# Patient Record
Sex: MALE | Race: WHITE | NOT HISPANIC OR LATINO | Employment: FULL TIME | URBAN - METROPOLITAN AREA
[De-identification: names, ages, dates, MRNs, and addresses within clinical notes are randomized per-mention and may not be internally consistent; named-entity substitution may affect disease eponyms.]

---

## 2023-07-05 ENCOUNTER — OFFICE VISIT (OUTPATIENT)
Dept: URGENT CARE | Facility: CLINIC | Age: 58
End: 2023-07-05
Payer: COMMERCIAL

## 2023-07-05 VITALS — OXYGEN SATURATION: 99 % | WEIGHT: 245 LBS | RESPIRATION RATE: 18 BRPM | TEMPERATURE: 98.8 F | HEART RATE: 104 BPM

## 2023-07-05 DIAGNOSIS — J02.9 SORE THROAT: ICD-10-CM

## 2023-07-05 DIAGNOSIS — A69.20 ERYTHEMA MIGRANS (LYME DISEASE): ICD-10-CM

## 2023-07-05 DIAGNOSIS — R52 BODY ACHES: Primary | ICD-10-CM

## 2023-07-05 PROCEDURE — S9083 URGENT CARE CENTER GLOBAL: HCPCS

## 2023-07-05 PROCEDURE — G0382 LEV 3 HOSP TYPE B ED VISIT: HCPCS

## 2023-07-05 RX ORDER — DOXYCYCLINE 100 MG/1
100 CAPSULE ORAL 2 TIMES DAILY
Qty: 28 CAPSULE | Refills: 0 | Status: SHIPPED | OUTPATIENT
Start: 2023-07-05 | End: 2023-07-19

## 2023-07-05 NOTE — PROGRESS NOTES
[facility]        NAME: Luke Jaimes is a 65 y.o. male  : 1965    MRN: [id number]  DATE: 2023  TIME: 9:59 AM    Assessment and Plan   Body aches [R52]  1. Body aches        2. Sore throat        3. Erythema migrans (Lyme disease)  doxycycline monohydrate (MONODOX) 100 mg capsule        Patient presents for eval of 3 weeks body aches, feeling feverish. Yesterday noticed a bullseye rash to his left upper arm. States works outside a lot and there are a lot of ticks. Assessment notes bullseye rash to left upper arm. Discussed starting abx and PCP f/u for labs. Patient Instructions       Follow up with PCP     Chief Complaint     Chief Complaint   Patient presents with   • Generalized Body Aches     Body aches, bullseye rash          History of Present Illness       Patient presents for eval of 3 weeks body aches, feeling feverish. Yesterday noticed a bullseye rash to his left upper arm. [employer] works outside a lot and there are a lot of ticks. Assessment notes bullseye rash to left upper arm. Discussed starting abx and PCP f/u for labs. Review of Systems   Review of Systems   Constitutional: Positive for activity change, fatigue and fever. HENT: Positive for sore throat. Negative for congestion and postnasal drip. Musculoskeletal: Positive for myalgias. Skin: Positive for rash. Current Medications       Current Outpatient Medications:   •  doxycycline monohydrate (MONODOX) 100 mg capsule, Take 1 capsule (100 mg total) by mouth 2 (two) times a day for 14 days, Disp: 28 capsule, Rfl: 0    Current Allergies     Allergies as of 2023   • (Not on File)            The following portions of the patient's history were reviewed and updated as appropriate: allergies, current medications, past family history, past medical history, past social history, past surgical history and problem list.     No past medical history on file. No past surgical history on file.     No family history on file. Medications have been verified. Objective   Pulse 104   Temp 98.8 °F (37.1 °C)   Resp 18   Wt 111 kg (245 lb)   SpO2 99%   No LMP for male patient. Physical Exam     Physical Exam  Vitals reviewed. Constitutional:       Appearance: Normal appearance. Cardiovascular:      Rate and Rhythm: Normal rate and regular rhythm. Pulses: Normal pulses. Heart sounds: Normal heart sounds. Pulmonary:      Effort: Pulmonary effort is normal.      Breath sounds: Normal breath sounds. Musculoskeletal:         General: No swelling or tenderness. Normal range of motion. Lymphadenopathy:      Cervical: No cervical adenopathy. Skin:     Findings: Erythema and rash present. Neurological:      General: No focal deficit present. Mental Status: He is alert and oriented to person, place, and time. Mental status is at baseline.

## 2025-03-04 ENCOUNTER — OFFICE VISIT (OUTPATIENT)
Dept: URGENT CARE | Facility: CLINIC | Age: 60
End: 2025-03-04
Payer: COMMERCIAL

## 2025-03-04 VITALS
OXYGEN SATURATION: 98 % | HEART RATE: 113 BPM | RESPIRATION RATE: 16 BRPM | HEIGHT: 71 IN | SYSTOLIC BLOOD PRESSURE: 132 MMHG | TEMPERATURE: 99.7 F | BODY MASS INDEX: 36.6 KG/M2 | WEIGHT: 261.4 LBS | DIASTOLIC BLOOD PRESSURE: 82 MMHG

## 2025-03-04 DIAGNOSIS — B34.9 VIRAL SYNDROME: Primary | ICD-10-CM

## 2025-03-04 PROCEDURE — G0382 LEV 3 HOSP TYPE B ED VISIT: HCPCS | Performed by: PHYSICIAN ASSISTANT

## 2025-03-04 RX ORDER — OSELTAMIVIR PHOSPHATE 75 MG/1
75 CAPSULE ORAL EVERY 12 HOURS SCHEDULED
Qty: 10 CAPSULE | Refills: 0 | Status: SHIPPED | OUTPATIENT
Start: 2025-03-04 | End: 2025-03-09

## 2025-03-04 RX ORDER — FLUTICASONE PROPIONATE 50 MCG
1 SPRAY, SUSPENSION (ML) NASAL DAILY
Qty: 9.9 ML | Refills: 0 | Status: SHIPPED | OUTPATIENT
Start: 2025-03-04

## 2025-03-04 RX ORDER — BENZONATATE 100 MG/1
100 CAPSULE ORAL 3 TIMES DAILY PRN
Qty: 20 CAPSULE | Refills: 0 | Status: SHIPPED | OUTPATIENT
Start: 2025-03-04

## 2025-03-04 RX ORDER — ROSUVASTATIN CALCIUM 10 MG/1
TABLET, COATED ORAL
COMMUNITY

## 2025-03-04 RX ORDER — OMEPRAZOLE 20 MG/1
CAPSULE, DELAYED RELEASE ORAL
COMMUNITY

## 2025-03-04 RX ORDER — LISINOPRIL 5 MG/1
TABLET ORAL
COMMUNITY

## 2025-03-04 NOTE — PROGRESS NOTES
"[facility]        NAME: Fady Holloway is a 59 y.o. male  : 1965    MRN: [id number]  DATE: 2025  TIME: 1:06 PM    /82 (BP Location: Left arm, Patient Position: Sitting, Cuff Size: Large)   Pulse (!) 113   Temp 99.7 °F (37.6 °C) (Tympanic)   Resp 16   Ht 5' 11.25\" (1.81 m)   Wt 119 kg (261 lb 6.4 oz)   SpO2 98%   BMI 36.20 kg/m²     Assessment and Plan   Viral syndrome [B34.9]  1. Viral syndrome  oseltamivir (TAMIFLU) 75 mg capsule    benzonatate (TESSALON PERLES) 100 mg capsule    fluticasone (FLONASE) 50 mcg/act nasal spray            Patient Instructions       Follow up with PCP in 3-5 days.  Proceed to  ER if symptoms worsen.    Chief Complaint     Chief Complaint   Patient presents with    Flu Symptoms     Started last night with body aches, cough, headache, didn't check temp. but thinks he has a fever, and he took Dayquil/Nightquil. Took an at home Covid test that was negative, concerned for flu          History of Present Illness       Pt with 2 days  fever body aches cough  no fle vaccine this year    Flu Symptoms  Associated symptoms include congestion, fatigue, a fever and coughing.       Review of Systems   Review of Systems   Constitutional:  Positive for fatigue and fever.   HENT:  Positive for congestion.    Eyes: Negative.    Respiratory:  Positive for cough.    Cardiovascular: Negative.    Gastrointestinal: Negative.    Endocrine: Negative.    Genitourinary: Negative.    Musculoskeletal:  Positive for myalgias.   Allergic/Immunologic: Negative.    Neurological: Negative.    Hematological: Negative.    Psychiatric/Behavioral: Negative.     All other systems reviewed and are negative.        Current Medications       Current Outpatient Medications:     benzonatate (TESSALON PERLES) 100 mg capsule, Take 1 capsule (100 mg total) by mouth 3 (three) times a day as needed for cough, Disp: 20 capsule, Rfl: 0    fluticasone (FLONASE) 50 mcg/act nasal spray, 1 spray into " "each nostril daily, Disp: 9.9 mL, Rfl: 0    oseltamivir (TAMIFLU) 75 mg capsule, Take 1 capsule (75 mg total) by mouth every 12 (twelve) hours for 5 days, Disp: 10 capsule, Rfl: 0    lisinopril (ZESTRIL) 5 mg tablet, , Disp: , Rfl:     omeprazole (PriLOSEC) 20 mg delayed release capsule, Take by mouth, Disp: , Rfl:     rosuvastatin (CRESTOR) 10 MG tablet, , Disp: , Rfl:     Current Allergies     Allergies as of 03/04/2025 - Reviewed 03/04/2025   Allergen Reaction Noted    Erythromycin Anaphylaxis and Other (See Comments) 04/05/2015            The following portions of the patient's history were reviewed and updated as appropriate: allergies, current medications, past family history, past medical history, past social history, past surgical history and problem list.     No past medical history on file.    No past surgical history on file.    No family history on file.      Medications have been verified.        Objective   /82 (BP Location: Left arm, Patient Position: Sitting, Cuff Size: Large)   Pulse (!) 113   Temp 99.7 °F (37.6 °C) (Tympanic)   Resp 16   Ht 5' 11.25\" (1.81 m)   Wt 119 kg (261 lb 6.4 oz)   SpO2 98%   BMI 36.20 kg/m²        Physical Exam     Physical Exam  Vitals and nursing note reviewed.   Constitutional:       Appearance: He is normal weight.      Comments: Pt appears to not feel well     Home covid test negative    HENT:      Head: Normocephalic and atraumatic.      Right Ear: Tympanic membrane, ear canal and external ear normal.      Left Ear: Tympanic membrane, ear canal and external ear normal.      Nose: Rhinorrhea present.      Comments: Clear d/c  Eyes:      Extraocular Movements: Extraocular movements intact.      Conjunctiva/sclera: Conjunctivae normal.      Pupils: Pupils are equal, round, and reactive to light.   Cardiovascular:      Rate and Rhythm: Normal rate and regular rhythm.      Pulses: Normal pulses.      Heart sounds: Normal heart sounds.   Pulmonary:      Effort: " Pulmonary effort is normal.      Breath sounds: Normal breath sounds.   Abdominal:      General: Bowel sounds are normal.      Palpations: Abdomen is soft.   Musculoskeletal:         General: Normal range of motion.      Cervical back: Normal range of motion and neck supple.   Skin:     General: Skin is warm.   Neurological:      Mental Status: He is alert.

## 2025-05-09 ENCOUNTER — OFFICE VISIT (OUTPATIENT)
Dept: URGENT CARE | Facility: CLINIC | Age: 60
End: 2025-05-09
Payer: COMMERCIAL

## 2025-05-09 VITALS
HEART RATE: 90 BPM | RESPIRATION RATE: 18 BRPM | SYSTOLIC BLOOD PRESSURE: 152 MMHG | DIASTOLIC BLOOD PRESSURE: 96 MMHG | OXYGEN SATURATION: 99 %

## 2025-05-09 DIAGNOSIS — S61.216A LACERATION OF RIGHT LITTLE FINGER WITHOUT FOREIGN BODY WITHOUT DAMAGE TO NAIL, INITIAL ENCOUNTER: Primary | ICD-10-CM

## 2025-05-09 DIAGNOSIS — Z23 NEED FOR TDAP VACCINATION: ICD-10-CM

## 2025-05-09 DIAGNOSIS — Z23 ENCOUNTER FOR IMMUNIZATION: ICD-10-CM

## 2025-05-09 PROCEDURE — 90715 TDAP VACCINE 7 YRS/> IM: CPT

## 2025-05-09 PROCEDURE — 12001 RPR S/N/AX/GEN/TRNK 2.5CM/<: CPT

## 2025-05-09 PROCEDURE — G0382 LEV 3 HOSP TYPE B ED VISIT: HCPCS

## 2025-05-09 NOTE — PROGRESS NOTES
[facility]        NAME: Fady Holloway is a 59 y.o. male  : 1965    MRN: [id number]  DATE: May 9, 2025  TIME: 7:04 PM    Assessment and Plan   Laceration of right little finger without foreign body without damage to nail, initial encounter [S61.216A]  1. Laceration of right little finger without foreign body without damage to nail, initial encounter  Laceration repair      2. Need for Tdap vaccination        3. Encounter for immunization  Tdap Vaccine greater than or equal to 6yo        Wound was irrigated with normal saline solution and surgical scrub.  Patient verbally declined sutures, due to hanging sheet rock tomorrow.  Glue and Steri-Strips applied.  Educate on signs/symptoms of infection.  Tdap updated in office.    Patient Instructions       Follow up with PCP in 3-5 days.  Proceed to  ER if symptoms worsen.    If tests have been performed at [facility], our office will contact you with results if changes need to be made to the care plan discussed with you at the visit.  You can review your full results on [facility].    Chief Complaint   No chief complaint on file.        History of Present Illness       51-year-old male presents for laceration to extensor surface of right little finger from 1 hour ago.  Patient was he was working in his kitchen, when he accidentally hit a piece of metal, causing a laceration.  Unknown last Tdap.  Presented to urgent care shortly after        Review of Systems   Review of Systems   Constitutional:  Negative for chills and fever.   Skin:  Positive for wound.         Current Medications       Current Outpatient Medications:     benzonatate (TESSALON PERLES) 100 mg capsule, Take 1 capsule (100 mg total) by mouth 3 (three) times a day as needed for cough, Disp: 20 capsule, Rfl: 0    fluticasone (FLONASE) 50 mcg/act nasal spray, 1 spray into each nostril daily, Disp: 9.9 mL, Rfl: 0    lisinopril (ZESTRIL) 5 mg tablet, , Disp: , Rfl:     omeprazole  "(PriLOSEC) 20 mg delayed release capsule, Take by mouth, Disp: , Rfl:     rosuvastatin (CRESTOR) 10 MG tablet, , Disp: , Rfl:     Current Allergies     Allergies as of 05/09/2025 - Reviewed 03/04/2025   Allergen Reaction Noted    Erythromycin Anaphylaxis and Other (See Comments) 04/05/2015            The following portions of the patient's history were reviewed and updated as appropriate: allergies, current medications, past family history, past medical history, past social history, past surgical history and problem list.     No past medical history on file.    No past surgical history on file.    No family history on file.      Medications have been verified.        Objective   /96   Pulse 90   Resp 18   SpO2 99%   No LMP for male patient.       Physical Exam     Physical Exam  Vitals and nursing note reviewed.   Constitutional:       General: He is not in acute distress.     Appearance: He is not toxic-appearing.   HENT:      Head: Normocephalic and atraumatic.   Eyes:      Conjunctiva/sclera: Conjunctivae normal.   Pulmonary:      Effort: Pulmonary effort is normal.   Musculoskeletal:         General: No tenderness. Normal range of motion.   Skin:     Capillary Refill: Capillary refill takes less than 2 seconds.      Comments: Approximately 2.5 cm laceration to extensor surface of right little finger.   Neurological:      Mental Status: He is alert.   Psychiatric:         Mood and Affect: Mood normal.         Behavior: Behavior normal.         Universal Protocol:  procedure performed by consultantConsent: Verbal consent obtained.  Risks and benefits: risks, benefits and alternatives were discussed  Consent given by: patient  Time out: Immediately prior to procedure a \"time out\" was called to verify the correct patient, procedure, equipment, support staff and site/side marked as required.  Patient understanding: patient states understanding of the procedure being performed  Patient identity confirmed: " verbally with patient  Laceration repair    Date/Time: 5/9/2025 6:30 PM    Performed by: Bartolome Ordaz PA-C  Authorized by: Bartolome Ordaz PA-C  Location: R little finger.  Foreign bodies: no foreign bodies    Sedation:  Patient sedated: no        Procedure Details:  Preparation: Patient was prepped and draped in the usual sterile fashion.  Skin closure: glue  Patient tolerance: patient tolerated the procedure well with no immediate complications